# Patient Record
Sex: FEMALE | Race: WHITE | NOT HISPANIC OR LATINO | Employment: FULL TIME | URBAN - METROPOLITAN AREA
[De-identification: names, ages, dates, MRNs, and addresses within clinical notes are randomized per-mention and may not be internally consistent; named-entity substitution may affect disease eponyms.]

---

## 2023-06-02 ENCOUNTER — TELEPHONE (OUTPATIENT)
Dept: OBSTETRICS AND GYNECOLOGY | Facility: CLINIC | Age: 23
End: 2023-06-02
Payer: COMMERCIAL

## 2023-06-02 ENCOUNTER — PATIENT MESSAGE (OUTPATIENT)
Dept: OBSTETRICS AND GYNECOLOGY | Facility: CLINIC | Age: 23
End: 2023-06-02
Payer: COMMERCIAL

## 2023-06-02 NOTE — TELEPHONE ENCOUNTER
Spoke with pt. She has R insurance. She has ovarian cysts and lots of problems with her IUD. She states that she probably wants the IUD removed. I scheduled her for next week with Emy.

## 2023-06-02 NOTE — TELEPHONE ENCOUNTER
----- Message from Jane Reyes sent at 6/2/2023 12:33 PM CDT -----  Regarding: appt  Contact: patient  .Type:  Same Day Appointment Request    Caller is requesting a same day appointment.  Caller declined first available appointment listed below.    Name of Caller:Edelmira  When is the first available appointment?n/a  Symptoms:ovarian cyst ruptured and PT was in the ER yesterday  Best Call Back Number:[phone]    Additional Information: PT is trying to come in for a same day appt with any obgyn, she was advised by the ER to make an appt for today with an OBGYN and she called this morning but has not heard back she states she would like a female.

## 2023-06-08 ENCOUNTER — PROCEDURE VISIT (OUTPATIENT)
Dept: OBSTETRICS AND GYNECOLOGY | Facility: CLINIC | Age: 23
End: 2023-06-08
Payer: COMMERCIAL

## 2023-06-08 ENCOUNTER — OFFICE VISIT (OUTPATIENT)
Dept: OBSTETRICS AND GYNECOLOGY | Facility: CLINIC | Age: 23
End: 2023-06-08
Payer: COMMERCIAL

## 2023-06-08 VITALS — HEART RATE: 79 BPM | SYSTOLIC BLOOD PRESSURE: 123 MMHG | WEIGHT: 123.63 LBS | DIASTOLIC BLOOD PRESSURE: 79 MMHG

## 2023-06-08 DIAGNOSIS — N83.209 CYST OF OVARY, UNSPECIFIED LATERALITY: ICD-10-CM

## 2023-06-08 DIAGNOSIS — R10.2 PELVIC PAIN: ICD-10-CM

## 2023-06-08 DIAGNOSIS — R10.2 PELVIC PAIN: Primary | ICD-10-CM

## 2023-06-08 LAB
B-HCG UR QL: NEGATIVE
BILIRUB SERPL-MCNC: NEGATIVE MG/DL
BLOOD URINE, POC: NEGATIVE
CLARITY, POC UA: NORMAL
COLOR, POC UA: NORMAL
CTP QC/QA: YES
GLUCOSE UR QL STRIP: NEGATIVE
KETONES UR QL STRIP: NEGATIVE
LEUKOCYTE ESTERASE URINE, POC: NORMAL
NITRITE, POC UA: NEGATIVE
PH, POC UA: 5.5
PROTEIN, POC: NEGATIVE
SPECIFIC GRAVITY, POC UA: 1.03
UROBILINOGEN, POC UA: NORMAL

## 2023-06-08 PROCEDURE — 81002 POCT URINE DIPSTICK WITHOUT MICROSCOPE: ICD-10-PCS | Mod: S$GLB,,,

## 2023-06-08 PROCEDURE — 3074F SYST BP LT 130 MM HG: CPT | Mod: CPTII,S$GLB,,

## 2023-06-08 PROCEDURE — 76856 US OB/GYN PROCEDURE (VIEWPOINT): ICD-10-PCS | Mod: S$GLB,,, | Performed by: OBSTETRICS & GYNECOLOGY

## 2023-06-08 PROCEDURE — 99203 PR OFFICE/OUTPT VISIT, NEW, LEVL III, 30-44 MIN: ICD-10-PCS | Mod: S$GLB,,,

## 2023-06-08 PROCEDURE — 1159F MED LIST DOCD IN RCRD: CPT | Mod: CPTII,S$GLB,,

## 2023-06-08 PROCEDURE — 99203 OFFICE O/P NEW LOW 30 MIN: CPT | Mod: S$GLB,,,

## 2023-06-08 PROCEDURE — 81025 URINE PREGNANCY TEST: CPT | Mod: S$GLB,,,

## 2023-06-08 PROCEDURE — 3078F DIAST BP <80 MM HG: CPT | Mod: CPTII,S$GLB,,

## 2023-06-08 PROCEDURE — 81002 URINALYSIS NONAUTO W/O SCOPE: CPT | Mod: S$GLB,,,

## 2023-06-08 PROCEDURE — 3078F PR MOST RECENT DIASTOLIC BLOOD PRESSURE < 80 MM HG: ICD-10-PCS | Mod: CPTII,S$GLB,,

## 2023-06-08 PROCEDURE — 3074F PR MOST RECENT SYSTOLIC BLOOD PRESSURE < 130 MM HG: ICD-10-PCS | Mod: CPTII,S$GLB,,

## 2023-06-08 PROCEDURE — 81025 POCT URINE PREGNANCY: ICD-10-PCS | Mod: S$GLB,,,

## 2023-06-08 PROCEDURE — 76856 US EXAM PELVIC COMPLETE: CPT | Mod: S$GLB,,, | Performed by: OBSTETRICS & GYNECOLOGY

## 2023-06-08 PROCEDURE — 1159F PR MEDICATION LIST DOCUMENTED IN MEDICAL RECORD: ICD-10-PCS | Mod: CPTII,S$GLB,,

## 2023-06-08 RX ORDER — ONDANSETRON 4 MG/1
8 TABLET, FILM COATED ORAL 2 TIMES DAILY
COMMUNITY
End: 2023-07-17

## 2023-06-08 RX ORDER — PANTOPRAZOLE SODIUM 40 MG/1
40 TABLET, DELAYED RELEASE ORAL
COMMUNITY
Start: 2023-04-10 | End: 2023-07-17

## 2023-06-08 NOTE — PROGRESS NOTES
Subjective:      Patient ID: Edelmira Burciaga is a 22 y.o. female who presents for evaluation today.    Chief Complaint:    Ovarian cysts (Pt went into ER last week for abdominal pain. They did a ct scan and told pt that she had 2-3 cysts on lower right side. She is still having some pain. Would like an ultrasound done.) and Birth control consult (Pt has had Kyleena IUD for a little over one year. She is unsure if she would like to keep it in. She feels like she's had problems ever since got it, and thinks this may be what's causing her cysts. )      History of Present Illness  HPI She has had a kyleena IUD for almost 2 years, reports some irregular spotting & pelvic pain with it. She does not have regular periods. She has increased acne. She tried pills before this but was not good about taking something everyday. She is currently sexually active, she notes pelvic pain and spotting after intercourse. She is concerned she may have ovarian cysts. She went to the ER last week, had a CT scan and saw 2-3 cysts on the lower right side. She has taken naproxen and used warm compresses. She is wanting to follow up with an ultrasound and decide on possibly removing the IUD after. She does not want a pelvic exam done today. She had her wellness done in December. She is not interested in a different form of birth control. She denies GI or  problems.    GYN History  No LMP recorded (lmp unknown). Patient has had an implant.   Date of Last Pap: N/A    VITALS  /79   Pulse 79   Wt 56.1 kg (123 lb 9.6 oz)   LMP  (LMP Unknown)   Weight: 56.1 kg (123 lb 9.6 oz)         PAST MEDICAL HISTORY  Past Medical History:   Diagnosis Date    Gastro-esophageal reflux disease without esophagitis     Hiatal hernia     History of stomach ulcers     IBS (irritable bowel syndrome)        PAST SURGICAL HISTORY  Past Surgical History:   Procedure Laterality Date    TONSILLECTOMY AND ADENOIDECTOMY         SOCIAL HISTORY  Social History      Tobacco Use   Smoking Status Never   Smokeless Tobacco Never   ,   Social History     Substance and Sexual Activity   Alcohol Use Not Currently        MEDICATIONS  Outpatient Medications Marked as Taking for the 6/8/23 encounter (Office Visit) with Emy Ca NP   Medication Sig Dispense Refill    dicyclomine HCl (DICYCLOMINE ORAL) Take by mouth.      ondansetron (ZOFRAN) 4 MG tablet Take 8 mg by mouth 2 (two) times daily.      pantoprazole (PROTONIX) 40 MG tablet Take 40 mg by mouth.           Review of Systems   Review of Systems   Constitutional:  Negative for activity change.   Eyes:  Negative for visual disturbance.   Respiratory:  Negative for shortness of breath.    Cardiovascular:  Negative for chest pain.   Gastrointestinal:  Negative for abdominal pain.   Genitourinary:  Positive for pelvic pain. Negative for vaginal bleeding.        No abnormal vaginal bleeding   Musculoskeletal:  Negative for back pain.   Integumentary:  Negative for rash and breast mass.   Neurological:  Negative for numbness.   Psychiatric/Behavioral:          No mood disturbance or changes    Breast: Negative for mass        Objective:     Physical Exam:   Constitutional: She is oriented to person, place, and time. No distress.       Cardiovascular:  Normal rate and regular rhythm.             Pulmonary/Chest: Effort normal and breath sounds normal. No respiratory distress.        Abdominal: Soft. She exhibits no distension. There is no abdominal tenderness.             Musculoskeletal: Moves all extremeties.       Neurological: She is alert and oriented to person, place, and time.    Skin: Skin is warm and dry.    Psychiatric: She has a normal mood and affect. Her behavior is normal.        Assessment:        1. Pelvic pain    2. Cyst of ovary, unspecified laterality       Pelvic pain  -     C. trachomatis/N. gonorrhoeae by AMP DNA Other; Urine  -     US OB/GYN Procedure ([facility]); Future  -     POCT urine pregnancy  -      POCT urine dipstick without microscope    Cyst of ovary, unspecified laterality  -     US OB/GYN Procedure ([facility]); Future         Plan:     Patient instructed to contact the clinic should any questions or concerns arise prior to her next office visit. Patient is happy with the plan of care at this time, verbalizes understanding and denies outstanding questions.      UPT & UA negative  G/C urine sent  Birth Control discussed  If you don't hear from the office regarding results within 1 week, please call  Follow up in 1 year for annual or sooner as needed

## 2023-06-12 LAB
CHLAMYDIA: NEGATIVE
GONORRHEA: NEGATIVE
SOURCE: NORMAL

## 2023-07-17 ENCOUNTER — OFFICE VISIT (OUTPATIENT)
Dept: OBSTETRICS AND GYNECOLOGY | Facility: CLINIC | Age: 23
End: 2023-07-17
Payer: COMMERCIAL

## 2023-07-17 VITALS — HEART RATE: 98 BPM | DIASTOLIC BLOOD PRESSURE: 78 MMHG | SYSTOLIC BLOOD PRESSURE: 118 MMHG | WEIGHT: 126.81 LBS

## 2023-07-17 DIAGNOSIS — Z30.432 ENCOUNTER FOR IUD REMOVAL: Primary | ICD-10-CM

## 2023-07-17 PROCEDURE — 58301 REMOVE INTRAUTERINE DEVICE: CPT | Mod: S$GLB,,,

## 2023-07-17 PROCEDURE — 99213 PR OFFICE/OUTPT VISIT, EST, LEVL III, 20-29 MIN: ICD-10-PCS | Mod: 25,S$GLB,,

## 2023-07-17 PROCEDURE — 1159F PR MEDICATION LIST DOCUMENTED IN MEDICAL RECORD: ICD-10-PCS | Mod: CPTII,S$GLB,,

## 2023-07-17 PROCEDURE — 1160F RVW MEDS BY RX/DR IN RCRD: CPT | Mod: CPTII,S$GLB,,

## 2023-07-17 PROCEDURE — 1159F MED LIST DOCD IN RCRD: CPT | Mod: CPTII,S$GLB,,

## 2023-07-17 PROCEDURE — 58301 PR REMOVE, INTRAUTERINE DEVICE: ICD-10-PCS | Mod: S$GLB,,,

## 2023-07-17 PROCEDURE — 3074F SYST BP LT 130 MM HG: CPT | Mod: CPTII,S$GLB,,

## 2023-07-17 PROCEDURE — 1160F PR REVIEW ALL MEDS BY PRESCRIBER/CLIN PHARMACIST DOCUMENTED: ICD-10-PCS | Mod: CPTII,S$GLB,,

## 2023-07-17 PROCEDURE — 3078F DIAST BP <80 MM HG: CPT | Mod: CPTII,S$GLB,,

## 2023-07-17 PROCEDURE — 3074F PR MOST RECENT SYSTOLIC BLOOD PRESSURE < 130 MM HG: ICD-10-PCS | Mod: CPTII,S$GLB,,

## 2023-07-17 PROCEDURE — 99213 OFFICE O/P EST LOW 20 MIN: CPT | Mod: 25,S$GLB,,

## 2023-07-17 PROCEDURE — 3078F PR MOST RECENT DIASTOLIC BLOOD PRESSURE < 80 MM HG: ICD-10-PCS | Mod: CPTII,S$GLB,,

## 2023-07-17 RX ORDER — OMEPRAZOLE 20 MG/1
20 CAPSULE, DELAYED RELEASE ORAL DAILY
COMMUNITY

## 2023-07-17 RX ORDER — CIPROFLOXACIN 500 MG/1
500 TABLET ORAL 2 TIMES DAILY
COMMUNITY
Start: 2023-07-12

## 2023-08-11 ENCOUNTER — OFFICE VISIT (OUTPATIENT)
Dept: FAMILY MEDICINE CLINIC | Facility: CLINIC | Age: 23
End: 2023-08-11

## 2023-08-11 VITALS
SYSTOLIC BLOOD PRESSURE: 124 MMHG | WEIGHT: 122 LBS | DIASTOLIC BLOOD PRESSURE: 88 MMHG | HEART RATE: 83 BPM | HEIGHT: 67 IN | BODY MASS INDEX: 19.15 KG/M2 | OXYGEN SATURATION: 100 %

## 2023-08-11 DIAGNOSIS — F41.1 GENERALIZED ANXIETY DISORDER: ICD-10-CM

## 2023-08-11 DIAGNOSIS — K21.9 GASTROESOPHAGEAL REFLUX DISEASE, UNSPECIFIED WHETHER ESOPHAGITIS PRESENT: ICD-10-CM

## 2023-08-11 DIAGNOSIS — F33.1 MODERATE EPISODE OF RECURRENT MAJOR DEPRESSIVE DISORDER: Primary | ICD-10-CM

## 2023-08-11 PROCEDURE — 99203 OFFICE O/P NEW LOW 30 MIN: CPT | Performed by: STUDENT IN AN ORGANIZED HEALTH CARE EDUCATION/TRAINING PROGRAM

## 2023-08-11 RX ORDER — PANTOPRAZOLE SODIUM 40 MG/1
40 TABLET, DELAYED RELEASE ORAL DAILY
COMMUNITY
Start: 2023-07-18

## 2023-08-11 NOTE — PROGRESS NOTES
"Chief Complaint  Establish Care (Plus tingling left leg pain..x2 weeks)    Subjective          Maty Suarez presents to [facility]  History of Present Illness    Patient is here to establish care.     She has anxiety, depression and OCD, and has been on Zoloft, Buspar, Lexapro, and another that she is not sure it was. She states that she is currently not taking any medication for this.  Patient states that she has had significant side effects from medications in the past and would like to have GeneSight testing done.  She has never had this performed in the past.    She also has had some numbness and tingling in the left leg for the past 2 weeks.  She states this goes on and off, and is usually when she has been up moving around for longer periods of time.  She states if she sits down and rests or uses ice or heat she has significant improvement.    GRED: she states that she has been on Protonix in the past. She states that she was on it for months, but is not taking it every day at this time. She has diagnosed with H.pylori, but has treated this.         Objective   Vital Signs:   /88   Pulse 83   Ht 170.2 cm (67\")   Wt 55.3 kg (122 lb)   SpO2 100%   BMI 19.11 kg/mý     Body mass index is 19.11 kg/mý.    Review of Systems    Past History:  Medical History: has a past medical history of Allergic, Anxiety, Depression, and Peptic ulceration.   Surgical History: has a past surgical history that includes Tonsillectomy.   Family History: family history includes Cancer in her paternal grandfather; Celiac disease in her father; Gout in her father; Hyperlipidemia in her father; No Known Problems in her brother.   Social History: reports that she has never smoked. She has never used smokeless tobacco. She reports that she does not drink alcohol and does not use drugs.      Current Outpatient Medications:     pantoprazole (PROTONIX) 40 MG EC tablet, Take 1 tablet by mouth Daily., " Disp: , Rfl:     Allergies: Tretinoin and Cefdinir    Physical Exam  Constitutional:       General: She is not in acute distress.     Appearance: She is not ill-appearing or toxic-appearing.   HENT:      Head: Normocephalic and atraumatic.   Cardiovascular:      Rate and Rhythm: Normal rate and regular rhythm.      Heart sounds: No murmur heard.  Pulmonary:      Effort: Pulmonary effort is normal. No respiratory distress.   Neurological:      General: No focal deficit present.      Mental Status: She is alert and oriented to person, place, and time.   Psychiatric:         Mood and Affect: Mood normal.         Thought Content: Thought content normal.        Result Review :                   Assessment and Plan    Diagnoses and all orders for this visit:    1. Moderate episode of recurrent major depressive disorder (Primary)    2. Generalized anxiety disorder    3. Gastroesophageal reflux disease, unspecified whether esophagitis present    Patient is on records releases today so that we can review her recent blood work which patient reports she had just a couple of months ago.  Will bring back in 4 to 6 weeks to follow-up on labs and order any other evaluation that is necessary.    Will order Democravise test on website to have this performed and can discuss at upcoming appointment.    Continue Protonix as needed.  Recommended that if she only has reflux occasionally she may benefit from switching to H2 blocker rather than PPI to take just as needed.        Follow Up   No follow-ups on file.  Patient was given instructions and counseling regarding her condition or for health maintenance advice. Please see specific information pulled into the AVS if appropriate.     Janna Ortiz, DO

## 2023-08-31 ENCOUNTER — OFFICE VISIT (OUTPATIENT)
Dept: FAMILY MEDICINE CLINIC | Facility: CLINIC | Age: 23
End: 2023-08-31
Payer: COMMERCIAL

## 2023-08-31 VITALS
HEART RATE: 97 BPM | BODY MASS INDEX: 19.3 KG/M2 | HEIGHT: 67 IN | SYSTOLIC BLOOD PRESSURE: 120 MMHG | WEIGHT: 123 LBS | OXYGEN SATURATION: 99 % | RESPIRATION RATE: 20 BRPM | DIASTOLIC BLOOD PRESSURE: 88 MMHG | TEMPERATURE: 97.7 F

## 2023-08-31 DIAGNOSIS — K59.00 CONSTIPATION, UNSPECIFIED CONSTIPATION TYPE: Primary | ICD-10-CM

## 2023-08-31 DIAGNOSIS — Z87.42 HISTORY OF OVARIAN CYST: ICD-10-CM

## 2023-08-31 DIAGNOSIS — R10.31 INTERMITTENT RIGHT LOWER QUADRANT ABDOMINAL PAIN: ICD-10-CM

## 2023-08-31 PROCEDURE — 99214 OFFICE O/P EST MOD 30 MIN: CPT | Performed by: PHYSICIAN ASSISTANT

## 2023-08-31 NOTE — PROGRESS NOTES
".Chief Complaint  Abdominal Pain and Ovarian Cyst    Subjective          History of Present Illness  Maty Suarez is here today with abdominal pain. She states that she has pain in her lower right quadrant. She states that this pain comes and goes. She states that it can wax and wane during the day. It has not been getting worse over time.     Patient states that she recently moved here from [place]. She states that she was seen in the ED in [place] two months ago for abdominal pain. The ED ordered a CT scan of her abdomen which demonstrated an ovarian cyst. She was told to take NSAIDs and sent home. She staes that she followed instructions and a few days later pain resolved. She was also seen (PCP? ED? ) and found to have UTI- found strep B in urine which was treated with CIPRO and felt better. She states that she had no frequency or dysuria - only lower abdominal pain.    She also saw GYN and had IUD removed and no change in pain. She states that they did an ultrasound at the GYN and found no ovarian cysts. Patient states that she has always had trouble with constipation  and takes miralax on occasion. She notes that when her abdomnial pain gets worse usually her constipation has been worse as well.     She is not sexually active currently - she states she took a pregnancy test yesterday that was negative. She staes taht she had a peroid for three days after IUD removal and none in the 6 weeks since.      Objective   Vital Signs:   /88 (BP Location: Right arm, Patient Position: Sitting, Cuff Size: Adult)   Pulse 97   Temp 97.7 øF (36.5 øC) (Temporal)   Resp 20   Ht 170.2 cm (67\")   Wt 55.8 kg (123 lb)   SpO2 99%   BMI 19.26 kg/mý     Body mass index is 19.26 kg/mý.      Review of Systems   Constitutional:  Negative for fever and unexpected weight loss.   Gastrointestinal:  Positive for abdominal pain and constipation. Negative for diarrhea, nausea and vomiting.   Genitourinary:  Negative for " dysuria, frequency and hematuria.   Musculoskeletal:  Negative for arthralgias and myalgias.       Current Outpatient Medications:     pantoprazole (PROTONIX) 40 MG EC tablet, Take 1 tablet by mouth Daily., Disp: , Rfl:     Allergies: Tretinoin and Cefdinir    Physical Exam  Vitals and nursing note reviewed.   Constitutional:       Appearance: Normal appearance. She is normal weight.   HENT:      Head: Normocephalic and atraumatic.   Cardiovascular:      Rate and Rhythm: Normal rate and regular rhythm.      Heart sounds: Normal heart sounds.   Pulmonary:      Effort: Pulmonary effort is normal.      Breath sounds: Normal breath sounds.   Abdominal:      General: Abdomen is flat. Bowel sounds are normal.      Palpations: Abdomen is soft.      Comments: Palpable stool thoughout colon including right lower quad. No tender ness no rebound    Neurological:      General: No focal deficit present.      Mental Status: She is alert.   Psychiatric:         Mood and Affect: Mood normal.      Palpable stool throughout.   Result Review :                   Assessment and Plan    Diagnoses and all orders for this visit:    1. Constipation, unspecified constipation type (Primary)  Discussed with patient that constipation may be another reason she is having this pain. Will place her back on miralax daily for at least two weeks. If she is having trouble with BM at all to call our office  2. Intermittent right lower quadrant abdominal pain  Discussed with patient that part of this may also be ovarian cysts on and off as well. It is recommended that  OCPs can help with cyst formation. Patient would like to get constipation under control before adding other medications  3. History of ovarian cyst        Follow Up   Return in about 1 month (around 9/30/2023), or if symptoms worsen or fail to improve keep carols appt.  Patient was given instructions and counseling regarding her condition or for health maintenance advice. Please see  specific information pulled into the AVS if appropriate.     JOSHUA Montenegro  08/31/2023

## 2023-09-22 ENCOUNTER — OFFICE VISIT (OUTPATIENT)
Dept: FAMILY MEDICINE CLINIC | Facility: CLINIC | Age: 23
End: 2023-09-22
Payer: COMMERCIAL

## 2023-09-22 VITALS
SYSTOLIC BLOOD PRESSURE: 120 MMHG | WEIGHT: 121 LBS | HEIGHT: 67 IN | BODY MASS INDEX: 18.99 KG/M2 | HEART RATE: 78 BPM | OXYGEN SATURATION: 98 % | DIASTOLIC BLOOD PRESSURE: 80 MMHG

## 2023-09-22 DIAGNOSIS — Z51.81 MEDICATION MONITORING ENCOUNTER: ICD-10-CM

## 2023-09-22 DIAGNOSIS — Z00.00 WELL ADULT EXAM: Primary | ICD-10-CM

## 2023-09-22 PROCEDURE — 99395 PREV VISIT EST AGE 18-39: CPT | Performed by: STUDENT IN AN ORGANIZED HEALTH CARE EDUCATION/TRAINING PROGRAM

## 2023-09-22 NOTE — PROGRESS NOTES
"Chief Complaint  Annual Exam    Subjective          Maty Suarez presents to [facility]  History of Present Illness    Patient is here for well visit. She going to be playing volleyball for [employer], and is needing a physical for this.  She states overall she is doing very well at this time.  She continues to take Protonix and is doing well.    Patient does have a history of remote concussion when she was about 14 years old.  She does not have any neurologic symptoms remaining.    Patient recently saw dermatology and is also about to start Accutane and needs to have blood work done prior to this.    Is any family history of breast or colon cancer.    Objective   Vital Signs:   /80   Pulse 78   Ht 170.2 cm (67\")   Wt 54.9 kg (121 lb)   SpO2 98%   BMI 18.95 kg/m²     Body mass index is 18.95 kg/m².    Review of Systems    Past History:  Medical History: has a past medical history of Allergic, Anxiety, Depression, and Peptic ulceration.   Surgical History: has a past surgical history that includes Tonsillectomy.   Family History: family history includes Cancer in her paternal grandfather; Celiac disease in her father; Gout in her father; Hyperlipidemia in her father; No Known Problems in her brother.   Social History: reports that she has never smoked. She has never used smokeless tobacco. She reports that she does not drink alcohol and does not use drugs.      Current Outpatient Medications:     pantoprazole (PROTONIX) 40 MG EC tablet, Take 1 tablet by mouth Daily., Disp: , Rfl:     Allergies: Tretinoin and Cefdinir    Physical Exam  Constitutional:       General: She is not in acute distress.     Appearance: She is not ill-appearing or toxic-appearing.   HENT:      Head: Normocephalic and atraumatic.   Cardiovascular:      Rate and Rhythm: Normal rate and regular rhythm.      Heart sounds: No murmur heard.  Pulmonary:      Effort: Pulmonary effort is " normal. No respiratory distress.   Abdominal:      General: Abdomen is flat.      Tenderness: There is no abdominal tenderness. There is no guarding or rebound.   Musculoskeletal:         General: No swelling, tenderness or deformity.      Right lower leg: No edema.      Left lower leg: No edema.   Skin:     General: Skin is warm and dry.   Neurological:      General: No focal deficit present.      Mental Status: She is alert and oriented to person, place, and time.      Cranial Nerves: No cranial nerve deficit.      Motor: No weakness.   Psychiatric:         Mood and Affect: Mood normal.         Thought Content: Thought content normal.        Result Review :                   Assessment and Plan    Diagnoses and all orders for this visit:    1. Well adult exam (Primary)  -     Cancel: Hemoglobinopathy Fractionation Cascade; Future  -     Cancel: CBC w AUTO Differential; Future  -     Cancel: Renal Function Panel; Future  -     Cancel: Lipid panel; Future  -     Cancel: Sickle Cell Screen; Future  -     CBC w AUTO Differential  -     Lipid panel  -     Renal Function Panel  -     Sickle Cell Screen    2. Medication monitoring encounter  -     Cancel: Hemoglobinopathy Fractionation Cascade; Future  -     Cancel: CBC w AUTO Differential; Future  -     Cancel: Renal Function Panel; Future  -     Cancel: Lipid panel; Future  -     Cancel: Sickle Cell Screen; Future  -     CBC w AUTO Differential  -     Lipid panel  -     Renal Function Panel  -     Sickle Cell Screen    Blood work ordered and will contact with results when available. Will adjust medications based on abnormalities seen.     Overall doing well at this time.  She does not need refills on her medication.  Follow-up in 1 year or sooner with any new or worsening symptoms.    Past medical and surgical history as well as allergies, family history and social history were reviewed, and discussed with patient.  Chronic conditions were reviewed as well as  medications.   Anticipatory guidance handouts including healthy diet, health maintenance, as well as regular exercise and general instructions were given via [employer], and patient was able to ask questions and discuss any concerns.        Follow Up   No follow-ups on file.  Patient was given instructions and counseling regarding her condition or for health maintenance advice. Please see specific information pulled into the AVS if appropriate.     Janna Ortiz, DO

## 2023-09-25 LAB
ALBUMIN SERPL-MCNC: 4.6 G/DL (ref 4–5)
BASOPHILS # BLD AUTO: 0 X10E3/UL (ref 0–0.2)
BASOPHILS NFR BLD AUTO: 0 %
BUN SERPL-MCNC: 10 MG/DL (ref 6–20)
BUN/CREAT SERPL: 9 (ref 9–23)
CALCIUM SERPL-MCNC: 9.3 MG/DL (ref 8.7–10.2)
CHLORIDE SERPL-SCNC: 103 MMOL/L (ref 96–106)
CHOLEST SERPL-MCNC: 149 MG/DL (ref 100–199)
CO2 SERPL-SCNC: 23 MMOL/L (ref 20–29)
CREAT SERPL-MCNC: 1.1 MG/DL (ref 0.57–1)
EGFRCR SERPLBLD CKD-EPI 2021: 74 ML/MIN/1.73
EOSINOPHIL # BLD AUTO: 0.1 X10E3/UL (ref 0–0.4)
EOSINOPHIL NFR BLD AUTO: 2 %
ERYTHROCYTE [DISTWIDTH] IN BLOOD BY AUTOMATED COUNT: 12.1 % (ref 11.7–15.4)
GLUCOSE SERPL-MCNC: 93 MG/DL (ref 70–99)
HCT VFR BLD AUTO: 40 % (ref 34–46.6)
HDLC SERPL-MCNC: 56 MG/DL
HGB BLD-MCNC: 13.3 G/DL (ref 11.1–15.9)
HGB S BLD QL SOLY: NEGATIVE
IMM GRANULOCYTES # BLD AUTO: 0 X10E3/UL (ref 0–0.1)
IMM GRANULOCYTES NFR BLD AUTO: 0 %
LDLC SERPL CALC-MCNC: 68 MG/DL (ref 0–99)
LYMPHOCYTES # BLD AUTO: 2.4 X10E3/UL (ref 0.7–3.1)
LYMPHOCYTES NFR BLD AUTO: 26 %
MCH RBC QN AUTO: 29.7 PG (ref 26.6–33)
MCHC RBC AUTO-ENTMCNC: 33.3 G/DL (ref 31.5–35.7)
MCV RBC AUTO: 89 FL (ref 79–97)
MONOCYTES # BLD AUTO: 0.5 X10E3/UL (ref 0.1–0.9)
MONOCYTES NFR BLD AUTO: 5 %
NEUTROPHILS # BLD AUTO: 6.3 X10E3/UL (ref 1.4–7)
NEUTROPHILS NFR BLD AUTO: 67 %
PHOSPHATE SERPL-MCNC: 3.5 MG/DL (ref 3–4.3)
PLATELET # BLD AUTO: 347 X10E3/UL (ref 150–450)
POTASSIUM SERPL-SCNC: 4.2 MMOL/L (ref 3.5–5.2)
RBC # BLD AUTO: 4.48 X10E6/UL (ref 3.77–5.28)
SODIUM SERPL-SCNC: 140 MMOL/L (ref 134–144)
TRIGL SERPL-MCNC: 143 MG/DL (ref 0–149)
VLDLC SERPL CALC-MCNC: 25 MG/DL (ref 5–40)
WBC # BLD AUTO: 9.4 X10E3/UL (ref 3.4–10.8)

## 2023-10-03 ENCOUNTER — TELEPHONE (OUTPATIENT)
Dept: FAMILY MEDICINE CLINIC | Facility: CLINIC | Age: 23
End: 2023-10-03
Payer: COMMERCIAL

## 2023-10-03 NOTE — TELEPHONE ENCOUNTER
Caller: Maty Suarez    Relationship: Self    Best call back number: [phone]    What is the best time to reach you: ANYTIME     Who are you requesting to speak with (clinical staff, provider,  specific staff member): CLINICAL STAFF    What was the call regarding: PATIENT IS WANTING TO MAKE SURE THAT THE RESULTS TO HER SICKLE CELL LABS WERE SENT TO HER UNIVERSITY.     Is it okay if the provider responds through MyChart: NO

## 2023-10-25 ENCOUNTER — TELEPHONE (OUTPATIENT)
Dept: FAMILY MEDICINE CLINIC | Facility: CLINIC | Age: 23
End: 2023-10-25
Payer: COMMERCIAL

## 2023-10-25 NOTE — TELEPHONE ENCOUNTER
Faxed over renal function panel to ordering provider. Fax went through. Thanks!    Fax number: [phone]

## 2023-10-25 NOTE — PROGRESS NOTES
Subjective:      Patient ID: Edelmira Burciaga is a 22 y.o. female who presents for evaluation today.    Chief Complaint:    IUD removal (Pt wants IUD removed today.) and Pelvic Pain (Pt states that after intercourse yesterday she had terrible pelvic pain. She was nauseated and couldn't stand up. She tried tylenol, heating pad, and hot baths but the pain lasted all day. Is still feeling the pain some this morning. Pt states she saw her PCP last week for her recurring pain. She currently has her on an antibiotic for a kidney infection and she is having a CT scan done this week.)      History of Present Illness  HPI She reports persistent pelvic pain, despite her normal US last month. She denies abnormal vaginal bleeding today. She did labwork with her PCP a 2 weeks ago, all normal. She also had an endoscopy & colonoscopy, which were negative in february. She is currently on cipro for kidney infection, was told she has GBS in her urine. She is having a CT scan this Wednesday, ordered by her PCP. She reports intense mid-line, sharp pelvic pain with intercourse yesterday. She took tylenol and epson salt bath to help with pain. She reports the pain was an 8/10 yesterday. Today she reports it is down to a dull ache. She is requesting her kyleena IUD to be removed to let her body rest in hopes to relieve her pain, she reports it is less than 2 years old. Offered to try OCP or vaginal ring, she was on OCPs them in the past, but didn't tolerate. 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    GYN History  No LMP recorded (lmp unknown). Patient has had an implant.   Date of Last Pap: N/A    VITALS  /78   Pulse 98   Wt 57.5 kg (126 lb 12.8 oz)   LMP  (LMP Unknown)   Weight: 57.5 kg (126 lb 12.8 oz)         PAST MEDICAL HISTORY  Past Medical History:   Diagnosis Date    Gastro-esophageal reflux disease without esophagitis     Hiatal hernia     History of stomach ulcers      Treated with shock therapy  Continue amiodarone  Ischemic evaluation  ETOH evaluation and cardiac echo pending    10/24/23  -Echo with EF of 20-25%  -Ischemic evaluation pending  -Continue amiodarone gtt for now  -Keep K > 4; Mg > 2    10/25/23  -Stable  -Keep on amiodarone for now  -Repeat LFT's   IBS (irritable bowel syndrome)        PAST SURGICAL HISTORY  Past Surgical History:   Procedure Laterality Date    TONSILLECTOMY AND ADENOIDECTOMY         SOCIAL HISTORY  Social History     Tobacco Use   Smoking Status Never   Smokeless Tobacco Never   ,   Social History     Substance and Sexual Activity   Alcohol Use Not Currently        MEDICATIONS  Outpatient Medications Marked as Taking for the 7/17/23 encounter (Office Visit) with Emy Ca NP   Medication Sig Dispense Refill    ciprofloxacin HCl (CIPRO) 500 MG tablet Take 500 mg by mouth 2 (two) times daily.      omeprazole (PRILOSEC) 20 MG capsule Take 20 mg by mouth once daily.           Review of Systems   Review of Systems   Constitutional:  Negative for activity change.   Eyes:  Negative for visual disturbance.   Respiratory:  Negative for shortness of breath.    Cardiovascular:  Negative for chest pain.   Gastrointestinal:  Negative for abdominal pain.   Genitourinary:  Positive for pelvic pain. Negative for vaginal bleeding.        No abnormal vaginal bleeding   Musculoskeletal:  Negative for back pain.   Integumentary:  Negative for rash and breast mass.   Neurological:  Negative for numbness.   Psychiatric/Behavioral:          No mood disturbance or changes    Breast: Negative for mass        Objective:     Physical Exam:   Constitutional: She appears well-developed.               Genitourinary:    Vagina and uterus normal.      Pelvic exam was performed with patient supine.   The external female genitalia was normal.   Labial bartholins normal.There is no tenderness or lesion on the right labia. There is no tenderness or lesion on the left labia. Cervix is normal. Right adnexum displays no tenderness and no fullness. Left adnexum displays no tenderness and no fullness. No  no vaginal discharge in the vagina. Uterus is not enlarged and not tender.    Genitourinary Comments: IUD removed with ease, pt tolerated well   IUD strings visualized.               Neurological: She is alert.          Assessment:        1. Encounter for IUD removal         Plan:     Patient instructed to contact the clinic should any questions or concerns arise prior to her next office visit. Patient is happy with the plan of care at this time, verbalizes understanding and denies outstanding questions.      IUD removed  Birth Control discussed  If you don't hear from the office regarding results within 1 week, please call  Follow up for annual or sooner as needed  Chaperone present for exam

## 2023-10-26 ENCOUNTER — OFFICE VISIT (OUTPATIENT)
Dept: FAMILY MEDICINE CLINIC | Facility: CLINIC | Age: 23
End: 2023-10-26
Payer: COMMERCIAL

## 2023-10-26 VITALS
DIASTOLIC BLOOD PRESSURE: 84 MMHG | OXYGEN SATURATION: 99 % | BODY MASS INDEX: 19.3 KG/M2 | WEIGHT: 123 LBS | SYSTOLIC BLOOD PRESSURE: 122 MMHG | HEART RATE: 75 BPM | HEIGHT: 67 IN

## 2023-10-26 DIAGNOSIS — L70.0 CYSTIC ACNE VULGARIS: Primary | ICD-10-CM

## 2023-10-26 DIAGNOSIS — I88.9 LYMPHADENITIS: ICD-10-CM

## 2023-10-26 PROCEDURE — 99213 OFFICE O/P EST LOW 20 MIN: CPT | Performed by: FAMILY MEDICINE

## 2023-10-26 NOTE — PROGRESS NOTES
"    Office Note     Name: Maty Suarez    : 2000     MRN: [id number]     Chief Complaint  Swollen Glands (X3 weeks, no other symptoms.)    Subjective     History of Present Illness:  Maty Suarez is a 22 y.o. female who presents today for swollen glands x3 weeks some in her neck and some in the right groin.  She is got acne and as a matter fact get need to start Accutane next week.  She is picking at the lesions now.  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    Review of Systems:   Review of Systems    Past Medical History:   Past Medical History:   Diagnosis Date    Allergic     Anxiety     Depression     Peptic ulceration        Past Surgical History:   Past Surgical History:   Procedure Laterality Date    TONSILLECTOMY         Family History:   Family History   Problem Relation Age of Onset    Hyperlipidemia Father     Celiac disease Father     Gout Father     No Known Problems Brother         EOE    Cancer Paternal Grandfather        Social History:   Social History     Socioeconomic History    Marital status: Single   Tobacco Use    Smoking status: Never     Passive exposure: Never    Smokeless tobacco: Never   Vaping Use    Vaping Use: Never used   Substance and Sexual Activity    Alcohol use: Never    Drug use: Never    Sexual activity: Yes     Partners: Male     Birth control/protection: None       Immunizations:   There is no immunization history on file for this patient.     Medications:     Current Outpatient Medications:     pantoprazole (PROTONIX) 40 MG EC tablet, Take 1 tablet by mouth As Needed., Disp: , Rfl:     Allergies:   Allergies   Allergen Reactions    Tretinoin Other (See Comments)     Chemical burns    Cefdinir Swelling and Rash     Fingers and feet swelling        Objective     Vital Signs  /84   Pulse 75   Ht 170.2 cm (67\")   Wt 55.8 kg (123 lb)   SpO2 99%   BMI 19.26 kg/m²   Estimated body mass index is 19.26 kg/m² as calculated from the " "following:    Height as of this encounter: 170.2 cm (67\").    Weight as of this encounter: 55.8 kg (123 lb).    BMI is within normal parameters. No other follow-up for BMI required.      Physical Exam  Vitals and nursing note reviewed.   HENT:      Head: Normocephalic and atraumatic.      Right Ear: Tympanic membrane, ear canal and external ear normal.      Left Ear: Tympanic membrane, ear canal and external ear normal.      Nose: No congestion or rhinorrhea.      Mouth/Throat:      Mouth: Mucous membranes are dry.   Eyes:      Extraocular Movements: Extraocular movements intact.      Conjunctiva/sclera: Conjunctivae normal.      Pupils: Pupils are equal, round, and reactive to light.   Cardiovascular:      Rate and Rhythm: Normal rate and regular rhythm.   Pulmonary:      Effort: Pulmonary effort is normal.      Breath sounds: Normal breath sounds.   Lymphadenopathy:      Cervical: Cervical adenopathy present.      Right cervical: Superficial cervical adenopathy and deep cervical adenopathy present.      Left cervical: Superficial cervical adenopathy and deep cervical adenopathy present.      Lower Body: Right inguinal adenopathy present.   Skin:     General: Skin is warm and dry.   Neurological:      General: No focal deficit present.      Mental Status: She is alert.          Procedures     Assessment and Plan     1. Cystic acne vulgaris  Causing the trouble I am afraid but get Accutane x2 weeks for feel the lymph node, decrease in size.    2. Lymphadenitis  1 2 tiny 4 mm lymph node in groin.  Reassured her       Follow Up  Return if symptoms worsen or fail to improve.    Jovany GARCIA [facility]  [address] [place] [id number] [phone]  "

## 2023-10-27 ENCOUNTER — TELEPHONE (OUTPATIENT)
Dept: FAMILY MEDICINE CLINIC | Facility: CLINIC | Age: 23
End: 2023-10-27
Payer: COMMERCIAL

## 2023-10-27 NOTE — TELEPHONE ENCOUNTER
Caller: Maty Suarez    Relationship: Self    Best call back number: [phone]    What orders are you requesting (i.e. lab or imaging): LAB WORK FOR LIPID, CBC, LIVER COUNT, RENAL, PREGNANCY TEST WAS SUPPOSE TO BE CALLED IN TO PT PCP OFFICE AND ORDERED BY DERMATOLOGIST:[facility] [place] PHONE NUMBER [phone]

## 2023-10-30 NOTE — TELEPHONE ENCOUNTER
Called pt back about this. She actually has questions about a different outside order. I told pt she can come in and have this done but she is concerned about a bill. I told her to contact [facility] and/or her ordering provider with her questions.

## 2023-11-21 ENCOUNTER — OFFICE VISIT (OUTPATIENT)
Dept: FAMILY MEDICINE CLINIC | Facility: CLINIC | Age: 23
End: 2023-11-21
Payer: COMMERCIAL

## 2023-11-21 VITALS
HEIGHT: 67 IN | SYSTOLIC BLOOD PRESSURE: 120 MMHG | OXYGEN SATURATION: 100 % | WEIGHT: 125 LBS | HEART RATE: 85 BPM | BODY MASS INDEX: 19.62 KG/M2 | DIASTOLIC BLOOD PRESSURE: 70 MMHG

## 2023-11-21 DIAGNOSIS — R59.0 INGUINAL LYMPHADENOPATHY: Primary | ICD-10-CM

## 2023-11-21 PROCEDURE — 99213 OFFICE O/P EST LOW 20 MIN: CPT | Performed by: STUDENT IN AN ORGANIZED HEALTH CARE EDUCATION/TRAINING PROGRAM

## 2023-11-21 NOTE — PROGRESS NOTES
"Chief Complaint  Adenopathy    Subjective          Maty Suarez presents to [facility]  History of Present Illness  Patient is here to follow up for lymphadenopathy. Was seen by Dr. Beckford 10/26/23 for this issue. She reports her bilateral cervical lymph nodes are bothering her and have not increased in size since she was recently seen. She reports she has one in her right groin that is larger than her other lymph nodes. Reports she was recently sick and went to [facility]. Was given a Z-pack and steroids. Denies fevers. Denies pain with palpation. She states she is \"worried that I have cancer\" and she would like to have an ultrasound done.         Objective   Vital Signs:   /70   Pulse 85   Ht 170.2 cm (67\")   Wt 56.7 kg (125 lb)   SpO2 100%   BMI 19.58 kg/m²     Body mass index is 19.58 kg/m².    Review of Systems    Past History:  Medical History: has a past medical history of Allergic, Anxiety, Depression, and Peptic ulceration.   Surgical History: has a past surgical history that includes Tonsillectomy.   Family History: family history includes Cancer in her paternal grandfather; Celiac disease in her father; Gout in her father; Hyperlipidemia in her father; No Known Problems in her brother.   Social History: reports that she has never smoked. She has never been exposed to tobacco smoke. She has never used smokeless tobacco. She reports that she does not drink alcohol and does not use drugs.      Current Outpatient Medications:     pantoprazole (PROTONIX) 40 MG EC tablet, Take 1 tablet by mouth As Needed., Disp: , Rfl:     Allergies: Tretinoin and Cefdinir    Physical Exam  Constitutional:       General: She is not in acute distress.     Appearance: She is not ill-appearing or toxic-appearing.   HENT:      Head: Normocephalic and atraumatic.   Neck:      Comments: Lymph nodes normal in palpation, however Right posterior lymph nodes are much more superficial then left, and " more easily palpated.   Pulmonary:      Effort: Pulmonary effort is normal.   Abdominal:      General: Abdomen is flat.      Tenderness: There is no abdominal tenderness.   Musculoskeletal:      Cervical back: Normal range of motion. No tenderness.      Comments: Bilateral inguinal lymphadenopathy. Soft and mobile. Right slightly larger than left   Skin:     General: Skin is warm and dry.   Neurological:      General: No focal deficit present.      Mental Status: She is alert and oriented to person, place, and time.   Psychiatric:         Mood and Affect: Mood normal.         Behavior: Behavior normal.          Result Review :                   Assessment and Plan    Diagnoses and all orders for this visit:    1. Inguinal lymphadenopathy (Primary)  -     US Soft Tissue; Future    Discussed with patient that we will start with an ultrasound of the inguinal lymph nodes. Discussed with patient that we will contact with results when those become available. Patient verbalized understanding and agreeable to plan.     I have reviewed the notes, assessments, and/or procedures performed by Radha HERMOSILLO Student, I concur with her/his documentation of Maty Suarez.       Follow Up   No follow-ups on file.  Patient was given instructions and counseling regarding her condition or for health maintenance advice. Please see specific information pulled into the AVS if appropriate.     Janna Ortiz, DO

## 2023-12-04 ENCOUNTER — TELEPHONE (OUTPATIENT)
Dept: FAMILY MEDICINE CLINIC | Facility: CLINIC | Age: 23
End: 2023-12-04
Payer: COMMERCIAL

## 2023-12-04 NOTE — TELEPHONE ENCOUNTER
Caller: Maty Suarez    Relationship: Self    Best call back number: [phone]     What was the call regarding: PATIENT WANTS TO KNOW IF THIS WILL BE SCHEDULED TODAY. PLEASE CALL WITH AN UPDATE.

## 2023-12-04 NOTE — TELEPHONE ENCOUNTER
Caller: Maty Suarez    Relationship: Self    Best call back number: [phone]     What orders are you requesting (i.e. lab or imaging): ULTRASOUND    In what timeframe would the patient need to come in: ASAP    Where will you [facility] your lab[facility]imaging services[facility]    Additional notes: WANTS AT [facility]

## 2023-12-05 DIAGNOSIS — R59.0 INGUINAL LYMPHADENOPATHY: Primary | ICD-10-CM

## 2023-12-06 NOTE — TELEPHONE ENCOUNTER
Caller: Maty Suarez    Relationship to patient: Self    Best call back number: [phone]     Patient is needing: PATIENT CALLED TO FOLLOW UP ON THE STATUS TO SCHEDULE HER ULTRASOUND AT [facility].    STATED IT WAS TO BE SCHEDULED A WEEK AGO.

## 2024-01-05 ENCOUNTER — TELEPHONE (OUTPATIENT)
Dept: FAMILY MEDICINE CLINIC | Facility: CLINIC | Age: 24
End: 2024-01-05
Payer: COMMERCIAL

## 2024-01-05 NOTE — TELEPHONE ENCOUNTER
Caller: Maty Suarez    Relationship: Self    Best call back number: [phone]     What was the call regarding:   PATIENT WOULD LIKE A CALL BACK REGARDING THE RESULTS OF HER ULTRASOUND THAT SHE HAS DONE AT [facility] ON 12/12/2023     PATIENT WOULD LIKE A CALL BACK TO SPEAK WITH PCP ABOUT THE SYMPTOMS THAT SHE HAS RECENTLY BEEN HAVING AND DISCUSS HER REFERRAL TO GASTROLOGY AND IF SHE NEED TO SEE A GASTROLOGIST OR A DIFFERENT DOCTOR FOR HER SYMPTOMS SHE IS CURRENTLY HAVING AT THIS TIME

## 2024-01-08 ENCOUNTER — TELEPHONE (OUTPATIENT)
Dept: FAMILY MEDICINE CLINIC | Facility: CLINIC | Age: 24
End: 2024-01-08
Payer: COMMERCIAL

## 2024-01-08 NOTE — TELEPHONE ENCOUNTER
Pt has a request for her referral to go to Dr. Jovany Ramos's office. He's within [facility], and in [place], [place]. For any questions she can be reach at [phone]    Spoke with Diana on the topic, she will need to be scheduled. So Coutts knows what she needs to be referred for.     Pt has been schedule, and understands what is going on!

## 2024-01-10 ENCOUNTER — OFFICE VISIT (OUTPATIENT)
Dept: FAMILY MEDICINE CLINIC | Facility: CLINIC | Age: 24
End: 2024-01-10
Payer: COMMERCIAL

## 2024-01-10 VITALS
HEIGHT: 67 IN | SYSTOLIC BLOOD PRESSURE: 120 MMHG | HEART RATE: 85 BPM | DIASTOLIC BLOOD PRESSURE: 60 MMHG | BODY MASS INDEX: 19.46 KG/M2 | WEIGHT: 124 LBS | OXYGEN SATURATION: 97 %

## 2024-01-10 DIAGNOSIS — K21.9 GASTROESOPHAGEAL REFLUX DISEASE, UNSPECIFIED WHETHER ESOPHAGITIS PRESENT: Primary | ICD-10-CM

## 2024-01-10 DIAGNOSIS — Z23 IMMUNIZATION DUE: ICD-10-CM

## 2024-01-10 DIAGNOSIS — K59.00 CONSTIPATION, UNSPECIFIED CONSTIPATION TYPE: ICD-10-CM

## 2024-01-10 DIAGNOSIS — Z11.1 VISIT FOR TB SKIN TEST: ICD-10-CM

## 2024-01-10 NOTE — PROGRESS NOTES
"Chief Complaint  GI Problem (ABDOMINAL PAIN, NAUSEA, CONSTIPATION, ETC) and TB Test (Pt also needs a TB skin test)    Subjective          Maty Suarez presents to [facility]  History of Present Illness    Patient states that she has been having more trouble with her stomach over the past several months. She states that it is worse over the LRQ. She has had this evaluated in the past several times. She states that she has been using metamucil for constipation.  She states that she has seen GI but this was in [place] and she was told that she had irritable bowel disease, but was not given any instructions on follow-up.    She states that the abdominal pain and constipation have caused her to not be able to leave her house the way that she normally would because of not knowing \", some of skin to do\".    Patient is also due for flu shot and TB skin test today.        Objective   Vital Signs:   /60   Pulse 85   Ht 170.2 cm (67\")   Wt 56.2 kg (124 lb)   SpO2 97%   BMI 19.42 kg/m²     Body mass index is 19.42 kg/m².    Review of Systems    Past History:  Medical History: has a past medical history of Allergic, Anxiety, Depression, and Peptic ulceration.   Surgical History: has a past surgical history that includes Tonsillectomy.   Family History: family history includes Cancer in her paternal grandfather; Celiac disease in her father; Gout in her father; Hyperlipidemia in her father; No Known Problems in her brother.   Social History: reports that she has never smoked. She has never been exposed to tobacco smoke. She has never used smokeless tobacco. She reports that she does not drink alcohol and does not use drugs.      Current Outpatient Medications:     pantoprazole (PROTONIX) 40 MG EC tablet, Take 1 tablet by mouth As Needed., Disp: , Rfl:     Allergies: Tretinoin and Cefdinir    Physical Exam  Constitutional:       General: She is not in acute distress.     Appearance: " She is not ill-appearing or toxic-appearing.   HENT:      Head: Normocephalic and atraumatic.   Cardiovascular:      Rate and Rhythm: Normal rate and regular rhythm.      Heart sounds: No murmur heard.  Pulmonary:      Effort: Pulmonary effort is normal. No respiratory distress.   Neurological:      General: No focal deficit present.      Mental Status: She is alert and oriented to person, place, and time.   Psychiatric:         Mood and Affect: Mood normal.         Thought Content: Thought content normal.          Result Review :                   Assessment and Plan    Diagnoses and all orders for this visit:    1. Gastroesophageal reflux disease, unspecified whether esophagitis present (Primary)  -     Ambulatory Referral to Gastroenterology    2. Constipation, unspecified constipation type  -     Ambulatory Referral to Gastroenterology    3. Immunization due  -     TB Skin Test  -     Fluzone (or Fluarix & Flulaval for VFC) >6mos    4. Visit for TB skin test  -     TB Skin Test    Will make referral to gastroenterology for further evaluation management.    Continue Protonix at this time.  Patient states that she has been told that she has been using occasionally.  Advised if she needs a refill of this to let us know.    TB test and Fluzone given in the office today.    Follow Up   No follow-ups on file.  Patient was given instructions and counseling regarding her condition or for health maintenance advice. Please see specific information pulled into the AVS if appropriate.     Janna Ortiz, DO

## 2024-01-12 ENCOUNTER — TELEPHONE (OUTPATIENT)
Dept: FAMILY MEDICINE CLINIC | Facility: CLINIC | Age: 24
End: 2024-01-12

## 2024-02-07 ENCOUNTER — TELEPHONE (OUTPATIENT)
Dept: FAMILY MEDICINE CLINIC | Facility: CLINIC | Age: 24
End: 2024-02-07
Payer: COMMERCIAL

## 2024-02-09 NOTE — TELEPHONE ENCOUNTER
Pt contacted and advised again that there are no results because she had come back too early to have it read and did not return at the advised time.

## 2024-02-09 NOTE — TELEPHONE ENCOUNTER
Pt did not come back at the correct time for it to be read, pt was instructed to come back within correct time window, pt verbalized understanding, but pt did not come back to have it read, therefore there are no results to give to pt.

## 2024-02-14 ENCOUNTER — TELEPHONE (OUTPATIENT)
Dept: GASTROENTEROLOGY | Facility: CLINIC | Age: 24
End: 2024-02-14

## 2024-02-14 ENCOUNTER — OFFICE VISIT (OUTPATIENT)
Dept: GASTROENTEROLOGY | Facility: CLINIC | Age: 24
End: 2024-02-14
Payer: COMMERCIAL

## 2024-02-14 VITALS
OXYGEN SATURATION: 98 % | DIASTOLIC BLOOD PRESSURE: 70 MMHG | WEIGHT: 125.8 LBS | SYSTOLIC BLOOD PRESSURE: 102 MMHG | HEIGHT: 68 IN | BODY MASS INDEX: 19.07 KG/M2

## 2024-02-14 DIAGNOSIS — B96.81 HELICOBACTER PYLORI GASTRITIS: Primary | ICD-10-CM

## 2024-02-14 DIAGNOSIS — R10.9 ABDOMINAL PAIN, UNSPECIFIED ABDOMINAL LOCATION: ICD-10-CM

## 2024-02-14 DIAGNOSIS — Z87.11 HISTORY OF STOMACH ULCERS: ICD-10-CM

## 2024-02-14 DIAGNOSIS — Z98.890 HISTORY OF COLONOSCOPY: ICD-10-CM

## 2024-02-14 DIAGNOSIS — K44.9 HIATAL HERNIA: ICD-10-CM

## 2024-02-14 DIAGNOSIS — K21.9 GASTROESOPHAGEAL REFLUX DISEASE, UNSPECIFIED WHETHER ESOPHAGITIS PRESENT: ICD-10-CM

## 2024-02-14 DIAGNOSIS — K29.70 HELICOBACTER PYLORI GASTRITIS: Primary | ICD-10-CM

## 2024-07-29 ENCOUNTER — TELEPHONE (OUTPATIENT)
Dept: FAMILY MEDICINE CLINIC | Facility: CLINIC | Age: 24
End: 2024-07-29
Payer: COMMERCIAL

## 2024-07-29 NOTE — TELEPHONE ENCOUNTER
Caller: Maty Suarez    Relationship: Self    Best call back number: [phone]    What was the call regarding: PATIENT HAS BEEN SCHEDULED WITH A PROVIDER WHO ISN'T THE PATIENT'S PC

## 2024-08-01 ENCOUNTER — OFFICE VISIT (OUTPATIENT)
Dept: FAMILY MEDICINE CLINIC | Facility: CLINIC | Age: 24
End: 2024-08-01
Payer: COMMERCIAL

## 2024-08-01 VITALS
HEART RATE: 79 BPM | WEIGHT: 128 LBS | OXYGEN SATURATION: 97 % | SYSTOLIC BLOOD PRESSURE: 124 MMHG | BODY MASS INDEX: 19.4 KG/M2 | DIASTOLIC BLOOD PRESSURE: 78 MMHG | HEIGHT: 68 IN

## 2024-08-01 DIAGNOSIS — J45.990 EXERCISE-INDUCED ASTHMA: Primary | ICD-10-CM

## 2024-08-01 DIAGNOSIS — Z87.42 HISTORY OF OVARIAN CYST: ICD-10-CM

## 2024-08-01 DIAGNOSIS — Z02.5 ROUTINE SPORTS PHYSICAL EXAM: ICD-10-CM

## 2024-08-01 RX ORDER — INHALER, ASSIST DEVICES
1 SPACER (EA) MISCELLANEOUS TAKE AS DIRECTED
Qty: 1 EACH | Refills: 0 | Status: SHIPPED | OUTPATIENT
Start: 2024-08-01

## 2024-08-01 RX ORDER — ALBUTEROL SULFATE 90 UG/1
2 AEROSOL, METERED RESPIRATORY (INHALATION) EVERY 4 HOURS PRN
Qty: 18 G | Refills: 1 | Status: SHIPPED | OUTPATIENT
Start: 2024-08-01

## 2024-08-01 NOTE — PROGRESS NOTES
"    Office Note     Name: Maty Suarez    : 2000     MRN: [id number]     Chief Complaint  Annual Exam (Pt is here for an annual exam for a sports physical ) and Asthma (Pt states she is needing a rescue inhaler due to her asthma )    Subjective     History of Present Illness:  Maty Suarez is a 23 y.o. female who presents today for needing refill on her rescue inhaler. She reports she prefers to use this with a chamber, as she feels she has better result, with deeper absorption into her lungs. She has history of exercise-induced asthma.     She is also in need of paperwork completed for sports physical for college. She attends \[employer]\"" [employer], plays volleyball.     She reports her  has encouraged her to have an MRI on her left shoulder, but she is not ready to do this. She reports she has a labrum tear in her right shoulder, has had MRI with contrast in the past that showed this. Her  feels she has weak muscles and/or secondary problems stemming from labrum tear in her right shoulder. She reports she does not hit with her left arm, so she is not in a hurry to have this imaging completed at this time.     She is requesting referral to GYN - reports she is established with one at home, but she is from [place] originally. She has history of ovarian cysts, \"inverted uterus\", and an IUD complication about a year ago.       Objective     Past Medical History:   Diagnosis Date    Allergic     Anxiety     Depression     Peptic ulceration      Past Surgical History:   Procedure Laterality Date    COLONOSCOPY      TONSILLECTOMY      UPPER GASTROINTESTINAL ENDOSCOPY       Family History   Problem Relation Age of Onset    Hyperlipidemia Father     Celiac disease Father     Gout Father     No Known Problems Brother         EOE    Cancer Paternal Grandfather        Vital Signs  /78 (BP Location: Left arm, Patient Position: Sitting)   Pulse 79   Ht 171.5 cm (67.5\")   Wt 58.1 " "kg (128 lb)   SpO2 97%   BMI 19.75 kg/m²   Estimated body mass index is 19.75 kg/m² as calculated from the following:    Height as of this encounter: 171.5 cm (67.5\").    Weight as of this encounter: 58.1 kg (128 lb).    Physical Exam  Vitals reviewed.   Constitutional:       Appearance: Normal appearance.   Cardiovascular:      Rate and Rhythm: Normal rate and regular rhythm.      Heart sounds: Normal heart sounds.   Pulmonary:      Effort: Pulmonary effort is normal.      Breath sounds: Normal breath sounds.   Skin:     General: Skin is warm and dry.      Capillary Refill: Capillary refill takes less than 2 seconds.   Neurological:      General: No focal deficit present.      Mental Status: She is alert and oriented to person, place, and time.   Psychiatric:         Mood and Affect: Mood normal.         Behavior: Behavior normal.          Assessment and Plan     Diagnoses and all orders for this visit:    1. Exercise-induced asthma (Primary)  -     albuterol sulfate  (90 Base) MCG/ACT inhaler; Inhale 2 puffs Every 4 (Four) Hours As Needed for Shortness of Air or Wheezing.  Dispense: 18 g; Refill: 1  -     Spacer/Aero-Holding Chambers (AeroChamber Holding Chamber) device; Use 1 each Take As Directed.  Dispense: 1 each; Refill: 0    2. Routine sports physical exam    3. History of ovarian cyst  -     Ambulatory Referral to Gynecology      Reviewed risks/benefits and possible side effects associated with albuterol inhaler, refill and chamber sent to pharmacy.     Sports physical paperwork completed and returned to patient.     Referral placed for GYN.       Follow Up  No follow-ups on file.    BAY Peng  "

## 2024-09-09 DIAGNOSIS — J45.990 EXERCISE-INDUCED ASTHMA: ICD-10-CM

## 2024-09-09 RX ORDER — ALBUTEROL SULFATE 90 UG/1
2 AEROSOL, METERED RESPIRATORY (INHALATION) EVERY 4 HOURS PRN
Qty: 18 G | Refills: 1 | Status: SHIPPED | OUTPATIENT
Start: 2024-09-09

## 2024-09-09 RX ORDER — ONDANSETRON 4 MG/1
4 TABLET, FILM COATED ORAL EVERY 8 HOURS PRN
Qty: 15 TABLET | Refills: 0 | Status: SHIPPED | OUTPATIENT
Start: 2024-09-09

## 2024-09-09 NOTE — TELEPHONE ENCOUNTER
Caller: Daniela Lizkarson    Relationship: Self    Best call back number: [phone]    Requested Prescriptions:   Requested Prescriptions     Pending Prescriptions Disp Refills    albuterol sulfate  (90 Base) MCG/ACT inhaler 18 g 1     Sig: Inhale 2 puffs Every 4 (Four) Hours As Needed for Shortness of Air or Wheezing.        Pharmacy where request should be sent: [id number] [address]  [address] [facility] [phone]  - [phone]     Last office visit with prescribing clinician: 1/10/2024   Last telemedicine visit with prescribing clinician: Visit date not found   Next office visit with prescribing clinician: Visit date not found     Additional details provided by patient:     Does the patient have less than a 3 day supply:  [x] Yes  [] No    Would you like a call back once the refill request has been completed: [] Yes [x] No    If the office needs to give you a call back, can they leave a voicemail: [] Yes [x] No    Tea Ordoñez Rep   09/09/24 15:34 EDT

## 2024-09-09 NOTE — TELEPHONE ENCOUNTER
Caller: Maty Suarez    Relationship: Self    Best call back number: [phone]    What medication are you requesting: ZOFRAN    What are your current symptoms: NAUSEA    Have you had these symptoms before:    [x] Yes  [] No    Have you been treated for these symptoms before:   [x] Yes  [] No    If a prescription is needed, what is your preferred pharmacy and phone number: [facility] #[id number] [place], [place] - [address]  [address] - [phone] [facility] [phone] FX

## 2024-10-29 ENCOUNTER — OFFICE VISIT (OUTPATIENT)
Dept: FAMILY MEDICINE CLINIC | Facility: CLINIC | Age: 24
End: 2024-10-29
Payer: COMMERCIAL

## 2024-10-29 VITALS
HEIGHT: 68 IN | WEIGHT: 119 LBS | DIASTOLIC BLOOD PRESSURE: 68 MMHG | SYSTOLIC BLOOD PRESSURE: 110 MMHG | OXYGEN SATURATION: 98 % | BODY MASS INDEX: 18.04 KG/M2 | HEART RATE: 108 BPM

## 2024-10-29 DIAGNOSIS — Z11.3 SCREENING EXAMINATION FOR STI: ICD-10-CM

## 2024-10-29 DIAGNOSIS — Z00.00 WELL ADULT EXAM: Primary | ICD-10-CM

## 2024-10-29 DIAGNOSIS — Z11.59 ENCOUNTER FOR HEPATITIS C SCREENING TEST FOR LOW RISK PATIENT: ICD-10-CM

## 2024-10-29 LAB
B-HCG UR QL: NEGATIVE
EXPIRATION DATE: NORMAL
INTERNAL NEGATIVE CONTROL: NORMAL
INTERNAL POSITIVE CONTROL: NORMAL
Lab: NORMAL

## 2024-10-29 PROCEDURE — 99395 PREV VISIT EST AGE 18-39: CPT | Performed by: STUDENT IN AN ORGANIZED HEALTH CARE EDUCATION/TRAINING PROGRAM

## 2024-10-29 PROCEDURE — 81025 URINE PREGNANCY TEST: CPT | Performed by: STUDENT IN AN ORGANIZED HEALTH CARE EDUCATION/TRAINING PROGRAM

## 2024-10-29 NOTE — PROGRESS NOTES
"Chief Complaint  std testing and Annual Exam    Subjective          Maty Suarez presents to [facility]  History of Present Illness    Patient is here for well visit.     She states that overall she is doing well at this time. She would like o have STI screening as she has not ha this done in the past several years.     She is due for blood work at this time.     She has no FH of breast or colon cancer.       Objective   Vital Signs:   /68   Pulse 108   Ht 171.5 cm (67.5\")   Wt 54 kg (119 lb)   SpO2 98%   BMI 18.36 kg/m²     Body mass index is 18.36 kg/m².    Review of Systems    Past History:  Medical History: has a past medical history of Allergic, Anxiety, Depression, and Peptic ulceration.   Surgical History: has a past surgical history that includes Tonsillectomy; Colonoscopy; and Upper gastrointestinal endoscopy.   Family History: family history includes Cancer in her paternal grandfather; Celiac disease in her father; Gout in her father; Hyperlipidemia in her father; No Known Problems in her brother.   Social History: reports that she has never smoked. She has never been exposed to tobacco smoke. She has never used smokeless tobacco. She reports that she does not drink alcohol and does not use drugs.      Current Outpatient Medications:     albuterol sulfate  (90 Base) MCG/ACT inhaler, Inhale 2 puffs Every 4 (Four) Hours As Needed for Shortness of Air or Wheezing., Disp: 18 g, Rfl: 1    Spacer/Aero-Holding Chambers (AeroChamber Holding Chamber) device, Use 1 each Take As Directed., Disp: 1 each, Rfl: 0    Allergies: Tretinoin and Cefdinir    Physical Exam  Constitutional:       General: She is not in acute distress.     Appearance: She is not ill-appearing or toxic-appearing.   HENT:      Head: Normocephalic and atraumatic.   Cardiovascular:      Rate and Rhythm: Normal rate and regular rhythm.      Heart sounds: No murmur heard.  Pulmonary:      Effort: " Pulmonary effort is normal. No respiratory distress.   Neurological:      General: No focal deficit present.      Mental Status: She is alert and oriented to person, place, and time.   Psychiatric:         Mood and Affect: Mood normal.         Thought Content: Thought content normal.          Result Review :                   Assessment and Plan    Diagnoses and all orders for this visit:    1. Well adult exam (Primary)  -     Comprehensive Metabolic Panel  -     CBC & Differential  -     Lipid Panel  -     TSH  -     T4, Free    2. Screening examination for STI  -     RPR  -     HIV-1/O/2 Ag/Ab w Reflex  -     Cancel: Chlamydia trachomatis, Neisseria gonorrhoeae, Trichomonas vaginalis, PCR - Swab, Urine, Clean Catch  -     Chlamydia trachomatis, Neisseria gonorrhoeae, Trichomonas vaginalis, PCR - Urine, Urine, Clean Catch  -     POCT pregnancy, urine    3. Encounter for hepatitis C screening test for low risk patient  -     Hepatitis C Antibody    Blood work ordered and will contact with results when available. Will adjust medications based on abnormalities seen.     Past medical and surgical history as well as allergies, family history and social history were reviewed, and discussed with patient.  Chronic conditions were reviewed as well as medications.   Anticipatory guidance handouts including healthy diet, health maintenance, as well as regular exercise and general instructions were given via [employer], and patient was able to ask questions and discuss any concerns.        Follow Up   No follow-ups on file.  Patient was given instructions and counseling regarding her condition or for health maintenance advice. Please see specific information pulled into the AVS if appropriate.     Janna Ortiz, DO

## 2024-10-30 ENCOUNTER — TELEPHONE (OUTPATIENT)
Dept: FAMILY MEDICINE CLINIC | Facility: CLINIC | Age: 24
End: 2024-10-30
Payer: COMMERCIAL

## 2024-10-30 LAB
ALBUMIN SERPL-MCNC: 4.6 G/DL (ref 4–5)
ALP SERPL-CCNC: 81 IU/L (ref 44–121)
ALT SERPL-CCNC: 15 IU/L (ref 0–32)
AST SERPL-CCNC: 21 IU/L (ref 0–40)
BASOPHILS # BLD AUTO: 0 X10E3/UL (ref 0–0.2)
BASOPHILS NFR BLD AUTO: 0 %
BILIRUB SERPL-MCNC: 0.5 MG/DL (ref 0–1.2)
BUN SERPL-MCNC: 16 MG/DL (ref 6–20)
BUN/CREAT SERPL: 16 (ref 9–23)
CALCIUM SERPL-MCNC: 10 MG/DL (ref 8.7–10.2)
CHLORIDE SERPL-SCNC: 100 MMOL/L (ref 96–106)
CHOLEST SERPL-MCNC: 158 MG/DL (ref 100–199)
CO2 SERPL-SCNC: 25 MMOL/L (ref 20–29)
CREAT SERPL-MCNC: 0.98 MG/DL (ref 0.57–1)
EGFRCR SERPLBLD CKD-EPI 2021: 84 ML/MIN/1.73
EOSINOPHIL # BLD AUTO: 0 X10E3/UL (ref 0–0.4)
EOSINOPHIL NFR BLD AUTO: 0 %
ERYTHROCYTE [DISTWIDTH] IN BLOOD BY AUTOMATED COUNT: 12.1 % (ref 11.7–15.4)
GLOBULIN SER CALC-MCNC: 2.9 G/DL (ref 1.5–4.5)
GLUCOSE SERPL-MCNC: 101 MG/DL (ref 70–99)
HCT VFR BLD AUTO: 47.5 % (ref 34–46.6)
HCV IGG SERPL QL IA: NON REACTIVE
HDLC SERPL-MCNC: 56 MG/DL
HGB BLD-MCNC: 15.7 G/DL (ref 11.1–15.9)
HIV 1+2 AB+HIV1 P24 AG SERPL QL IA: NON REACTIVE
IMM GRANULOCYTES # BLD AUTO: 0.1 X10E3/UL (ref 0–0.1)
IMM GRANULOCYTES NFR BLD AUTO: 1 %
LDLC SERPL CALC-MCNC: 71 MG/DL (ref 0–99)
LYMPHOCYTES # BLD AUTO: 1.8 X10E3/UL (ref 0.7–3.1)
LYMPHOCYTES NFR BLD AUTO: 17 %
MCH RBC QN AUTO: 31.1 PG (ref 26.6–33)
MCHC RBC AUTO-ENTMCNC: 33.1 G/DL (ref 31.5–35.7)
MCV RBC AUTO: 94 FL (ref 79–97)
MONOCYTES # BLD AUTO: 0.4 X10E3/UL (ref 0.1–0.9)
MONOCYTES NFR BLD AUTO: 4 %
NEUTROPHILS # BLD AUTO: 8.6 X10E3/UL (ref 1.4–7)
NEUTROPHILS NFR BLD AUTO: 78 %
PLATELET # BLD AUTO: 333 X10E3/UL (ref 150–450)
POTASSIUM SERPL-SCNC: 4.5 MMOL/L (ref 3.5–5.2)
PROT SERPL-MCNC: 7.5 G/DL (ref 6–8.5)
RBC # BLD AUTO: 5.05 X10E6/UL (ref 3.77–5.28)
RPR SER QL: NON REACTIVE
SODIUM SERPL-SCNC: 139 MMOL/L (ref 134–144)
T4 FREE SERPL-MCNC: 1.53 NG/DL (ref 0.82–1.77)
TRIGL SERPL-MCNC: 187 MG/DL (ref 0–149)
TSH SERPL DL<=0.005 MIU/L-ACNC: 1.07 UIU/ML (ref 0.45–4.5)
VLDLC SERPL CALC-MCNC: 31 MG/DL (ref 5–40)
WBC # BLD AUTO: 10.9 X10E3/UL (ref 3.4–10.8)

## 2024-10-30 NOTE — TELEPHONE ENCOUNTER
Caller: Daniela Lizkarson    Relationship: Self    Best call back number: [phone]     Caller requesting test results: pt    What test was performed: blood work, URINALYSIS    When was the test performed: 10-29-24    Where was the test performed: office    Additional notes: PT WOULD LIKE A CALL TO DISCUSS THE TEST RESULTS REGARDLESS OF RESULTS..

## 2024-10-31 LAB
C TRACH RRNA SPEC QL NAA+PROBE: NEGATIVE
N GONORRHOEA RRNA SPEC QL NAA+PROBE: NEGATIVE
T VAGINALIS RRNA SPEC QL NAA+PROBE: NEGATIVE

## 2024-11-12 ENCOUNTER — OFFICE VISIT (OUTPATIENT)
Dept: FAMILY MEDICINE CLINIC | Facility: CLINIC | Age: 24
End: 2024-11-12
Payer: COMMERCIAL

## 2024-11-12 VITALS
WEIGHT: 119 LBS | HEIGHT: 68 IN | BODY MASS INDEX: 18.04 KG/M2 | SYSTOLIC BLOOD PRESSURE: 110 MMHG | DIASTOLIC BLOOD PRESSURE: 68 MMHG

## 2024-11-12 DIAGNOSIS — N92.0 MENORRHAGIA WITH REGULAR CYCLE: Primary | ICD-10-CM

## 2024-11-12 PROCEDURE — 99213 OFFICE O/P EST LOW 20 MIN: CPT | Performed by: STUDENT IN AN ORGANIZED HEALTH CARE EDUCATION/TRAINING PROGRAM

## 2024-11-12 RX ORDER — ACETAMINOPHEN AND CODEINE PHOSPHATE 120; 12 MG/5ML; MG/5ML
1 SOLUTION ORAL DAILY
Qty: 28 TABLET | Refills: 1 | Status: SHIPPED | OUTPATIENT
Start: 2024-11-12

## 2024-11-12 NOTE — PROGRESS NOTES
"Chief Complaint  Menorrhagia    Subjective          Maty Suarez presents to [facility]  History of Present Illness    Patient presents to the office for discussion of menorrhagia.     She states that she has been under more stress and she states that she has been more weak and tired over the past week or so. She states that she has been having clots and severe cramping. She states that she has been off of all birth control for the past few years.     Objective   Vital Signs:   /68   Ht 171.5 cm (67.5\")   Wt 54 kg (119 lb)   BMI 18.36 kg/m²     Body mass index is 18.36 kg/m².    Review of Systems    Past History:  Medical History: has a past medical history of Allergic, Anxiety, Depression, and Peptic ulceration.   Surgical History: has a past surgical history that includes Tonsillectomy; Colonoscopy; and Upper gastrointestinal endoscopy.   Family History: family history includes Cancer in her paternal grandfather; Celiac disease in her father; Gout in her father; Hyperlipidemia in her father; No Known Problems in her brother.   Social History: reports that she has never smoked. She has never been exposed to tobacco smoke. She has never used smokeless tobacco. She reports that she does not drink alcohol and does not use drugs.      Current Outpatient Medications:     albuterol sulfate  (90 Base) MCG/ACT inhaler, Inhale 2 puffs Every 4 (Four) Hours As Needed for Shortness of Air or Wheezing., Disp: 18 g, Rfl: 1    norethindrone (Ortho Micronor) 0.35 MG tablet, Take 1 tablet by mouth Daily., Disp: 28 tablet, Rfl: 1    Spacer/Aero-Holding Chambers (AeroChamber Holding Chamber) device, Use 1 each Take As Directed., Disp: 1 each, Rfl: 0    Allergies: Tretinoin and Cefdinir    Physical Exam  Constitutional:       General: She is not in acute distress.     Appearance: She is not ill-appearing or toxic-appearing.   HENT:      Head: Normocephalic and atraumatic. "   Cardiovascular:      Rate and Rhythm: Normal rate and regular rhythm.      Heart sounds: No murmur heard.  Pulmonary:      Effort: Pulmonary effort is normal. No respiratory distress.   Musculoskeletal:      Right lower leg: No edema.      Left lower leg: No edema.   Neurological:      General: No focal deficit present.      Mental Status: She is alert and oriented to person, place, and time.   Psychiatric:         Mood and Affect: Mood normal.         Thought Content: Thought content normal.          Result Review :                   Assessment and Plan    Diagnoses and all orders for this visit:    1. Menorrhagia with regular cycle (Primary)  -     Cancel: Iron Profile  -     Cancel: Ferritin  -     Cancel: CBC & Differential  -     CBC & Differential  -     Ferritin  -     Iron Profile    Other orders  -     norethindrone (Ortho Micronor) 0.35 MG tablet; Take 1 tablet by mouth Daily.  Dispense: 28 tablet; Refill: 1    Blood work ordered and will contact with results when available. Will also start norethrindrone  for 1 month to regulate periods. Follow up in 1 month or sooner with any new or worsening symptoms.       Follow Up   No follow-ups on file.  Patient was given instructions and counseling regarding her condition or for health maintenance advice. Please see specific information pulled into the AVS if appropriate.     Janna Ortiz, DO   Additional handoff

## 2024-11-13 LAB
BASOPHILS # BLD AUTO: 0 X10E3/UL (ref 0–0.2)
BASOPHILS NFR BLD AUTO: 0 %
EOSINOPHIL # BLD AUTO: 0.1 X10E3/UL (ref 0–0.4)
EOSINOPHIL NFR BLD AUTO: 1 %
ERYTHROCYTE [DISTWIDTH] IN BLOOD BY AUTOMATED COUNT: 12.1 % (ref 11.7–15.4)
FERRITIN SERPL-MCNC: 57 NG/ML (ref 15–150)
HCT VFR BLD AUTO: 44.7 % (ref 34–46.6)
HGB BLD-MCNC: 14.4 G/DL (ref 11.1–15.9)
IMM GRANULOCYTES # BLD AUTO: 0 X10E3/UL (ref 0–0.1)
IMM GRANULOCYTES NFR BLD AUTO: 0 %
IRON SATN MFR SERPL: 28 % (ref 15–55)
IRON SERPL-MCNC: 90 UG/DL (ref 27–159)
LYMPHOCYTES # BLD AUTO: 1.6 X10E3/UL (ref 0.7–3.1)
LYMPHOCYTES NFR BLD AUTO: 23 %
MCH RBC QN AUTO: 30 PG (ref 26.6–33)
MCHC RBC AUTO-ENTMCNC: 32.2 G/DL (ref 31.5–35.7)
MCV RBC AUTO: 93 FL (ref 79–97)
MONOCYTES # BLD AUTO: 0.5 X10E3/UL (ref 0.1–0.9)
MONOCYTES NFR BLD AUTO: 8 %
NEUTROPHILS # BLD AUTO: 4.6 X10E3/UL (ref 1.4–7)
NEUTROPHILS NFR BLD AUTO: 68 %
PLATELET # BLD AUTO: 294 X10E3/UL (ref 150–450)
RBC # BLD AUTO: 4.8 X10E6/UL (ref 3.77–5.28)
TIBC SERPL-MCNC: 323 UG/DL (ref 250–450)
UIBC SERPL-MCNC: 233 UG/DL (ref 131–425)
WBC # BLD AUTO: 6.8 X10E3/UL (ref 3.4–10.8)

## 2024-11-26 ENCOUNTER — TELEPHONE (OUTPATIENT)
Dept: FAMILY MEDICINE CLINIC | Facility: CLINIC | Age: 24
End: 2024-11-26

## 2024-11-26 NOTE — TELEPHONE ENCOUNTER
Caller: Maty Suarez    Relationship: Self    Best call back number: [phone]    What is the best time to reach you: ANYTIME     Who are you requesting to speak with (clinical staff, provider,  specific staff member): CLINICAL STAFF    PATIENT WANTS TO KNOW IF XRAYS HAD BEEN RECEIVED FROM [facility].